# Patient Record
Sex: MALE | Race: WHITE | NOT HISPANIC OR LATINO | URBAN - METROPOLITAN AREA
[De-identification: names, ages, dates, MRNs, and addresses within clinical notes are randomized per-mention and may not be internally consistent; named-entity substitution may affect disease eponyms.]

---

## 2023-11-05 ENCOUNTER — ANCILLARY PROCEDURE (OUTPATIENT)
Dept: RADIOLOGY | Facility: CLINIC | Age: 28
End: 2023-11-05
Payer: COMMERCIAL

## 2023-11-05 DIAGNOSIS — M54.2 CERVICALGIA: ICD-10-CM

## 2023-11-05 PROCEDURE — 72050 X-RAY EXAM NECK SPINE 4/5VWS: CPT

## 2023-11-05 PROCEDURE — 72050 X-RAY EXAM NECK SPINE 4/5VWS: CPT | Performed by: RADIOLOGY

## 2025-02-14 ENCOUNTER — ANCILLARY PROCEDURE (OUTPATIENT)
Dept: URGENT CARE | Age: 30
End: 2025-02-14
Payer: COMMERCIAL

## 2025-02-14 ENCOUNTER — OFFICE VISIT (OUTPATIENT)
Dept: URGENT CARE | Age: 30
End: 2025-02-14
Payer: COMMERCIAL

## 2025-02-14 VITALS
WEIGHT: 218 LBS | BODY MASS INDEX: 29.57 KG/M2 | TEMPERATURE: 98.6 F | SYSTOLIC BLOOD PRESSURE: 123 MMHG | OXYGEN SATURATION: 98 % | RESPIRATION RATE: 20 BRPM | DIASTOLIC BLOOD PRESSURE: 81 MMHG | HEART RATE: 73 BPM

## 2025-02-14 DIAGNOSIS — M89.8X1 PAIN OF LEFT SCAPULA: ICD-10-CM

## 2025-02-14 DIAGNOSIS — M89.8X1 PAIN OF LEFT SCAPULA: Primary | ICD-10-CM

## 2025-02-14 PROCEDURE — 73010 X-RAY EXAM OF SHOULDER BLADE: CPT | Mod: LEFT SIDE

## 2025-02-14 RX ORDER — METHOCARBAMOL 500 MG/1
500 TABLET, FILM COATED ORAL 4 TIMES DAILY
Qty: 20 TABLET | Refills: 0 | Status: SHIPPED | OUTPATIENT
Start: 2025-02-14 | End: 2025-02-19

## 2025-02-14 RX ORDER — LIDOCAINE 50 MG/G
1 PATCH TOPICAL DAILY
Qty: 10 PATCH | Refills: 0 | Status: SHIPPED | OUTPATIENT
Start: 2025-02-14 | End: 2025-02-24

## 2025-02-14 RX ORDER — ACETAMINOPHEN 500 MG
1000 TABLET ORAL EVERY 8 HOURS PRN
Qty: 30 TABLET | Refills: 0 | Status: SHIPPED | OUTPATIENT
Start: 2025-02-14 | End: 2025-02-19

## 2025-02-14 NOTE — PROGRESS NOTES
Subjective   Patient ID: Guanako Baeza is a 29 y.o. male. They present today with a chief complaint of Shoulder Pain (Pt c/o lt shoulder pain since Monday, states he woke up with the pain ).    History of Present Illness  See mdm       Shoulder Pain      Past Medical History  Allergies as of 02/14/2025    (No Known Allergies)       (Not in a hospital admission)       No past medical history on file.    No past surgical history on file.     reports that he has quit smoking. His smoking use included cigarettes. He has quit using smokeless tobacco.    Review of Systems  Review of Systems   All other systems reviewed and are negative.                                 Objective    Vitals:    02/14/25 0837   BP: 123/81   BP Location: Left arm   Patient Position: Sitting   BP Cuff Size: Large adult   Pulse: 73   Resp: 20   Temp: 37 °C (98.6 °F)   TempSrc: Oral   SpO2: 98%   Weight: 98.9 kg (218 lb)     No LMP for male patient.    Physical Exam  Vitals and nursing note reviewed.   Constitutional:       General: He is not in acute distress.     Appearance: He is normal weight. He is not ill-appearing, toxic-appearing or diaphoretic.   HENT:      Head: Normocephalic and atraumatic.      Nose: Nose normal.      Mouth/Throat:      Mouth: Mucous membranes are moist.   Eyes:      General: No scleral icterus.        Right eye: No discharge.         Left eye: No discharge.      Extraocular Movements: Extraocular movements intact.      Conjunctiva/sclera: Conjunctivae normal.      Pupils: Pupils are equal, round, and reactive to light.   Cardiovascular:      Rate and Rhythm: Normal rate and regular rhythm.      Pulses: Normal pulses.      Heart sounds: No murmur heard.     No friction rub. No gallop.   Pulmonary:      Effort: Pulmonary effort is normal. No respiratory distress.      Breath sounds: No wheezing, rhonchi or rales.   Chest:      Chest wall: No tenderness.   Abdominal:      General: Abdomen is flat.      Tenderness:  There is no abdominal tenderness. There is no guarding or rebound.      Comments: No abdominal tenderness rebound or guarding.  Neg Carbajal sign.  Neg [place] tenderness.  Abdomen is soft.     Musculoskeletal:         General: Tenderness present.      Cervical back: Normal range of motion and neck supple. No rigidity.      Comments: Left scapular tenderness, surrounding muscular tenderness to palpation.  Pain with ROM  of the shoulder greater than 90 degrees abduction.  No tenderness of the humerus, elbow, forearm.  NVI.  Soft compartments   Skin:     General: Skin is warm and dry.      Capillary Refill: Capillary refill takes less than 2 seconds.   Neurological:      General: No focal deficit present.      Mental Status: He is alert.   Psychiatric:         Mood and Affect: Mood normal.         Behavior: Behavior normal.         Procedures    Point of Care Test & Imaging Results from this visit  No results found for this visit on 02/14/25.   No results found.    Diagnostic study results (if any) were reviewed by Vanessa Magaña PA-C.    Assessment/Plan   Allergies, medications, history, and pertinent labs/EKGs/Imaging reviewed by Vanessa Magaña PA-C.     Medical Decision Making  29-year-old male otherwise healthy presents with complaint of left scapular pain.  Patient states he woke up with pain on Monday.  He is a  but cannot think of any particular injury or overuse.  He has had pain all week at work.  He has been taking ibuprofen last dose this morning.  No associated symptoms such as chest pain, shortness of breath, abdominal pain, nausea, vomiting, paresthesias, weakness, neck pain.  Exam as above.  Suspect myalgia although will obtain x-ray with consideration for osseous abnormality.  No features of acute fracture or dislocation, referred chest or abdominal pain, compartment syndrome, neurovascular compromise or other emergency.  X-ray of left scapula***.  Provided medication for pain  control.  Follow-up with orthopedist as needed for persistent pain.  Discharged good condition agreeable to plan as discussed.    Orders and Diagnoses  Diagnoses and all orders for this visit:  Pain of left scapula  -     XR scapula left; Future  -     methocarbamol (Robaxin) 500 mg tablet; Take 1 tablet (500 mg) by mouth 4 times a day for 5 days.  -     lidocaine (Lidoderm) 5 % patch; Place 1 patch over 12 hours on the skin once daily for 10 days. Remove & discard patch within 12 hours or as directed by MD.  -     acetaminophen (Tylenol) 500 mg tablet; Take 2 tablets (1,000 mg) by mouth every 8 hours if needed for mild pain (1 - 3) for up to 5 days.      Medical Admin Record      Patient disposition: Home    Electronically signed by Vanessa Magaña PA-C  9:10 AM

## 2025-02-14 NOTE — LETTER
February 14, 2025     Patient: Guanako Baeza   YOB: 1995   Date of Visit: 2/14/2025       To Whom It May Concern:    Guanako Baeza was seen in my clinic on 2/14/2025 at 8:45 am. Please excuse Guanako for his absence from work on this day to make the appointment. He can return to work tomorrow 2/15/25.    If you have any questions or concerns, please don't hesitate to call.         Sincerely,         Vanessa Magaña PA-C

## 2025-02-26 ENCOUNTER — APPOINTMENT (OUTPATIENT)
Dept: ORTHOPEDIC SURGERY | Facility: CLINIC | Age: 30
End: 2025-02-26
Payer: COMMERCIAL

## 2025-07-18 ENCOUNTER — OFFICE VISIT (OUTPATIENT)
Dept: ORTHOPEDIC SURGERY | Facility: HOSPITAL | Age: 30
End: 2025-07-18
Payer: COMMERCIAL

## 2025-07-18 ENCOUNTER — HOSPITAL ENCOUNTER (OUTPATIENT)
Dept: RADIOLOGY | Facility: HOSPITAL | Age: 30
Discharge: HOME | End: 2025-07-18
Payer: COMMERCIAL

## 2025-07-18 DIAGNOSIS — M79.645 PAIN OF LEFT THUMB: ICD-10-CM

## 2025-07-18 DIAGNOSIS — S60.012A CONTUSION OF LEFT THUMB WITHOUT DAMAGE TO NAIL, INITIAL ENCOUNTER: Primary | ICD-10-CM

## 2025-07-18 PROCEDURE — 99204 OFFICE O/P NEW MOD 45 MIN: CPT | Performed by: PHYSICIAN ASSISTANT

## 2025-07-18 PROCEDURE — 99202 OFFICE O/P NEW SF 15 MIN: CPT | Performed by: PHYSICIAN ASSISTANT

## 2025-07-18 PROCEDURE — 73140 X-RAY EXAM OF FINGER(S): CPT | Mod: LT

## 2025-07-18 ASSESSMENT — PAIN SCALES - GENERAL: PAINLEVEL_OUTOF10: 6

## 2025-07-18 ASSESSMENT — PAIN - FUNCTIONAL ASSESSMENT: PAIN_FUNCTIONAL_ASSESSMENT: 0-10

## 2025-07-18 NOTE — LETTER
July 18, 2025     Patient: Guanako Baeza   YOB: 1995   Date of Visit: 7/18/2025       To Whom It May Concern:    Guanako Baeza was seen in my clinic on 7/18/2025 at 10:00 am. Please excuse Guanako for his absence from work on this day to make the appointment.    If you have any questions or concerns, please don't hesitate to call.         Sincerely,         Dylon Aquino PA-C        CC: No Recipients
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
## 2025-07-18 NOTE — PATIENT INSTRUCTIONS
You can use a thumbster brace for cushioning and support    You can use a bucket of room temperature water with Epson salt and do your thumb/hand exercises     The patient was given a prescription for occupational therapy.  Occupational therapy is medically necessary to improve strength, balance, range of motion and functional outcomes after injury and/or surgery.    Follow up with hand as needed

## 2025-08-06 ENCOUNTER — ANCILLARY PROCEDURE (OUTPATIENT)
Dept: URGENT CARE | Age: 30
End: 2025-08-06
Payer: COMMERCIAL

## 2025-08-06 ENCOUNTER — OFFICE VISIT (OUTPATIENT)
Dept: URGENT CARE | Age: 30
End: 2025-08-06
Payer: COMMERCIAL

## 2025-08-06 VITALS
RESPIRATION RATE: 14 BRPM | TEMPERATURE: 98.5 F | OXYGEN SATURATION: 98 % | SYSTOLIC BLOOD PRESSURE: 118 MMHG | WEIGHT: 218 LBS | BODY MASS INDEX: 29.57 KG/M2 | HEART RATE: 65 BPM | DIASTOLIC BLOOD PRESSURE: 81 MMHG

## 2025-08-06 DIAGNOSIS — S20.212A CONTUSION OF LEFT CHEST WALL, INITIAL ENCOUNTER: ICD-10-CM

## 2025-08-06 DIAGNOSIS — S20.212A CONTUSION OF LEFT CHEST WALL, INITIAL ENCOUNTER: Primary | ICD-10-CM

## 2025-08-06 PROCEDURE — 99213 OFFICE O/P EST LOW 20 MIN: CPT

## 2025-08-06 PROCEDURE — 71101 X-RAY EXAM UNILAT RIBS/CHEST: CPT | Mod: LEFT SIDE

## 2025-08-06 RX ORDER — LIDOCAINE 50 MG/G
1 PATCH TOPICAL DAILY
Qty: 10 PATCH | Refills: 0 | Status: SHIPPED | OUTPATIENT
Start: 2025-08-06 | End: 2025-08-16

## 2025-08-06 RX ORDER — METHOCARBAMOL 500 MG/1
500 TABLET, FILM COATED ORAL 4 TIMES DAILY
Qty: 20 TABLET | Refills: 0 | Status: SHIPPED | OUTPATIENT
Start: 2025-08-06 | End: 2025-08-11
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
## 2025-08-06 NOTE — LETTER
August 6, 2025     Patient: Guanako Baeza   YOB: 1995   Date of Visit: 8/6/2025       To Whom It May Concern:    Guanako Baeza was seen in my clinic on 8/6/2025 at 9:25 am. Please excuse Guanako for his absence from work on this day to make the appointment.    If you have any questions or concerns, please don't hesitate to call.         Sincerely,         Vanessa Magaña PA-C